# Patient Record
Sex: FEMALE | URBAN - METROPOLITAN AREA
[De-identification: names, ages, dates, MRNs, and addresses within clinical notes are randomized per-mention and may not be internally consistent; named-entity substitution may affect disease eponyms.]

---

## 2021-10-14 ENCOUNTER — APPOINTMENT (RX ONLY)
Dept: URBAN - METROPOLITAN AREA CLINIC 134 | Facility: CLINIC | Age: 66
Setting detail: DERMATOLOGY
End: 2021-10-14

## 2021-10-14 VITALS — WEIGHT: 134 LBS | HEIGHT: 64 IN

## 2021-10-14 DIAGNOSIS — L82.1 OTHER SEBORRHEIC KERATOSIS: ICD-10-CM

## 2021-10-14 DIAGNOSIS — L64.8 OTHER ANDROGENIC ALOPECIA: ICD-10-CM

## 2021-10-14 DIAGNOSIS — D485 NEOPLASM OF UNCERTAIN BEHAVIOR OF SKIN: ICD-10-CM

## 2021-10-14 DIAGNOSIS — D18.0 HEMANGIOMA: ICD-10-CM

## 2021-10-14 DIAGNOSIS — D22 MELANOCYTIC NEVI: ICD-10-CM

## 2021-10-14 DIAGNOSIS — Z71.89 OTHER SPECIFIED COUNSELING: ICD-10-CM

## 2021-10-14 DIAGNOSIS — L82.0 INFLAMED SEBORRHEIC KERATOSIS: ICD-10-CM

## 2021-10-14 DIAGNOSIS — L81.4 OTHER MELANIN HYPERPIGMENTATION: ICD-10-CM

## 2021-10-14 PROBLEM — D22.5 MELANOCYTIC NEVI OF TRUNK: Status: ACTIVE | Noted: 2021-10-14

## 2021-10-14 PROBLEM — D18.01 HEMANGIOMA OF SKIN AND SUBCUTANEOUS TISSUE: Status: ACTIVE | Noted: 2021-10-14

## 2021-10-14 PROBLEM — D48.5 NEOPLASM OF UNCERTAIN BEHAVIOR OF SKIN: Status: ACTIVE | Noted: 2021-10-14

## 2021-10-14 PROCEDURE — 11102 TANGNTL BX SKIN SINGLE LES: CPT | Mod: 59

## 2021-10-14 PROCEDURE — ? BIOPSY BY SHAVE METHOD

## 2021-10-14 PROCEDURE — ? PHOTO-DOCUMENTATION

## 2021-10-14 PROCEDURE — ? ADDITIONAL NOTES

## 2021-10-14 PROCEDURE — ? LIQUID NITROGEN

## 2021-10-14 PROCEDURE — ? TREATMENT REGIMEN

## 2021-10-14 PROCEDURE — 17110 DESTRUCTION B9 LES UP TO 14: CPT

## 2021-10-14 PROCEDURE — 99204 OFFICE O/P NEW MOD 45 MIN: CPT | Mod: 25

## 2021-10-14 PROCEDURE — ? COUNSELING

## 2021-10-14 ASSESSMENT — LOCATION DETAILED DESCRIPTION DERM
LOCATION DETAILED: INFERIOR THORACIC SPINE
LOCATION DETAILED: LEFT INFERIOR MEDIAL UPPER BACK
LOCATION DETAILED: RIGHT DISTAL PRETIBIAL REGION
LOCATION DETAILED: RIGHT PROXIMAL PRETIBIAL REGION
LOCATION DETAILED: RIGHT ANTERIOR PROXIMAL THIGH
LOCATION DETAILED: RIGHT VENTRAL PROXIMAL FOREARM
LOCATION DETAILED: RIGHT POSTERIOR SHOULDER
LOCATION DETAILED: LEFT DISTAL PRETIBIAL REGION

## 2021-10-14 ASSESSMENT — LOCATION SIMPLE DESCRIPTION DERM
LOCATION SIMPLE: RIGHT SHOULDER
LOCATION SIMPLE: LEFT PRETIBIAL REGION
LOCATION SIMPLE: RIGHT PRETIBIAL REGION
LOCATION SIMPLE: RIGHT FOREARM
LOCATION SIMPLE: RIGHT THIGH
LOCATION SIMPLE: LEFT UPPER BACK
LOCATION SIMPLE: UPPER BACK

## 2021-10-14 ASSESSMENT — LOCATION ZONE DERM
LOCATION ZONE: TRUNK
LOCATION ZONE: ARM
LOCATION ZONE: LEG

## 2021-11-23 ENCOUNTER — APPOINTMENT (RX ONLY)
Dept: URBAN - METROPOLITAN AREA CLINIC 134 | Facility: CLINIC | Age: 66
Setting detail: DERMATOLOGY
End: 2021-11-23

## 2021-11-23 VITALS — WEIGHT: 134 LBS | HEIGHT: 64 IN

## 2021-11-23 DIAGNOSIS — L98.8 OTHER SPECIFIED DISORDERS OF THE SKIN AND SUBCUTANEOUS TISSUE: ICD-10-CM

## 2021-11-23 PROBLEM — C44.91 BASAL CELL CARCINOMA OF SKIN, UNSPECIFIED: Status: ACTIVE | Noted: 2021-11-23

## 2021-11-23 PROCEDURE — ? TREATMENT REGIMEN

## 2021-11-23 PROCEDURE — ? PRESCRIPTION

## 2021-11-23 PROCEDURE — 99213 OFFICE O/P EST LOW 20 MIN: CPT

## 2021-11-23 PROCEDURE — ? ADDITIONAL NOTES

## 2021-11-23 PROCEDURE — ? MEDICAL CONSULTATION: FILLERS

## 2021-11-23 PROCEDURE — ? COUNSELING

## 2021-11-23 RX ORDER — IMIQUIMOD 50 MG/G
CREAM TOPICAL
Qty: 1 | Refills: 0 | Status: ERX | COMMUNITY
Start: 2021-11-23

## 2021-11-23 RX ADMIN — IMIQUIMOD: 50 CREAM TOPICAL at 00:00

## 2022-02-03 ENCOUNTER — APPOINTMENT (RX ONLY)
Dept: URBAN - METROPOLITAN AREA CLINIC 133 | Facility: CLINIC | Age: 67
Setting detail: DERMATOLOGY
End: 2022-02-03

## 2022-02-03 VITALS — HEIGHT: 64 IN | WEIGHT: 134 LBS

## 2022-02-03 DIAGNOSIS — Z41.9 ENCOUNTER FOR PROCEDURE FOR PURPOSES OTHER THAN REMEDYING HEALTH STATE, UNSPECIFIED: ICD-10-CM

## 2022-02-03 PROBLEM — C44.612 BASAL CELL CARCINOMA OF SKIN OF RIGHT UPPER LIMB, INCLUDING SHOULDER: Status: ACTIVE | Noted: 2022-02-03

## 2022-02-03 PROCEDURE — ? ADDITIONAL NOTES

## 2022-02-03 PROCEDURE — 17260 DSTRJ MAL LES T/A/L 0.5 CM/<: CPT

## 2022-02-03 PROCEDURE — ? CRYOSURGICAL DESTRUCTION

## 2022-02-03 PROCEDURE — ? COUNSELING

## 2022-02-03 PROCEDURE — ? PATIENT SPECIFIC COUNSELING

## 2023-06-20 ENCOUNTER — APPOINTMENT (RX ONLY)
Dept: URBAN - METROPOLITAN AREA CLINIC 133 | Facility: CLINIC | Age: 68
Setting detail: DERMATOLOGY
End: 2023-06-20

## 2023-06-20 VITALS — HEIGHT: 64 IN | WEIGHT: 130 LBS

## 2023-06-20 DIAGNOSIS — L82.1 OTHER SEBORRHEIC KERATOSIS: ICD-10-CM

## 2023-06-20 DIAGNOSIS — L72.8 OTHER FOLLICULAR CYSTS OF THE SKIN AND SUBCUTANEOUS TISSUE: ICD-10-CM

## 2023-06-20 DIAGNOSIS — Z71.89 OTHER SPECIFIED COUNSELING: ICD-10-CM

## 2023-06-20 DIAGNOSIS — L57.0 ACTINIC KERATOSIS: ICD-10-CM

## 2023-06-20 PROCEDURE — ? LIQUID NITROGEN

## 2023-06-20 PROCEDURE — 17000 DESTRUCT PREMALG LESION: CPT

## 2023-06-20 PROCEDURE — ? COUNSELING

## 2023-06-20 PROCEDURE — 99213 OFFICE O/P EST LOW 20 MIN: CPT | Mod: 25

## 2023-06-20 ASSESSMENT — LOCATION ZONE DERM
LOCATION ZONE: NOSE
LOCATION ZONE: LEG
LOCATION ZONE: HAND

## 2023-06-20 ASSESSMENT — LOCATION SIMPLE DESCRIPTION DERM
LOCATION SIMPLE: RIGHT HAND
LOCATION SIMPLE: LEFT POSTERIOR THIGH
LOCATION SIMPLE: LEFT NOSE

## 2023-06-20 ASSESSMENT — LOCATION DETAILED DESCRIPTION DERM
LOCATION DETAILED: LEFT DISTAL LATERAL POSTERIOR THIGH
LOCATION DETAILED: LEFT NASAL SIDEWALL
LOCATION DETAILED: RIGHT RADIAL DORSAL HAND

## 2023-08-17 ENCOUNTER — APPOINTMENT (RX ONLY)
Dept: URBAN - METROPOLITAN AREA CLINIC 133 | Facility: CLINIC | Age: 68
Setting detail: DERMATOLOGY
End: 2023-08-17

## 2023-08-17 VITALS — HEIGHT: 64 IN | WEIGHT: 127 LBS

## 2023-08-17 DIAGNOSIS — L81.4 OTHER MELANIN HYPERPIGMENTATION: ICD-10-CM

## 2023-08-17 DIAGNOSIS — D18.0 HEMANGIOMA: ICD-10-CM

## 2023-08-17 DIAGNOSIS — L82.1 OTHER SEBORRHEIC KERATOSIS: ICD-10-CM

## 2023-08-17 DIAGNOSIS — Z71.89 OTHER SPECIFIED COUNSELING: ICD-10-CM

## 2023-08-17 DIAGNOSIS — L57.0 ACTINIC KERATOSIS: ICD-10-CM

## 2023-08-17 DIAGNOSIS — D22 MELANOCYTIC NEVI: ICD-10-CM

## 2023-08-17 DIAGNOSIS — Z85.828 PERSONAL HISTORY OF OTHER MALIGNANT NEOPLASM OF SKIN: ICD-10-CM

## 2023-08-17 PROBLEM — D18.01 HEMANGIOMA OF SKIN AND SUBCUTANEOUS TISSUE: Status: ACTIVE | Noted: 2023-08-17

## 2023-08-17 PROBLEM — D22.5 MELANOCYTIC NEVI OF TRUNK: Status: ACTIVE | Noted: 2023-08-17

## 2023-08-17 PROCEDURE — ? LIQUID NITROGEN

## 2023-08-17 PROCEDURE — 17000 DESTRUCT PREMALG LESION: CPT

## 2023-08-17 PROCEDURE — 17003 DESTRUCT PREMALG LES 2-14: CPT

## 2023-08-17 PROCEDURE — ? COUNSELING

## 2023-08-17 PROCEDURE — 99213 OFFICE O/P EST LOW 20 MIN: CPT | Mod: 25

## 2023-08-17 ASSESSMENT — LOCATION DETAILED DESCRIPTION DERM
LOCATION DETAILED: LEFT DISTAL LATERAL POSTERIOR THIGH
LOCATION DETAILED: LEFT DISTAL DORSAL FOREARM
LOCATION DETAILED: RIGHT INFERIOR LATERAL MIDBACK
LOCATION DETAILED: LEFT RADIAL DORSAL HAND
LOCATION DETAILED: RIGHT POSTERIOR SHOULDER
LOCATION DETAILED: LEFT CENTRAL FRONTAL SCALP
LOCATION DETAILED: INFERIOR THORACIC SPINE
LOCATION DETAILED: RIGHT PROXIMAL DORSAL FOREARM
LOCATION DETAILED: EPIGASTRIC SKIN

## 2023-08-17 ASSESSMENT — LOCATION SIMPLE DESCRIPTION DERM
LOCATION SIMPLE: LEFT POSTERIOR THIGH
LOCATION SIMPLE: RIGHT SHOULDER
LOCATION SIMPLE: RIGHT FOREARM
LOCATION SIMPLE: LEFT FOREARM
LOCATION SIMPLE: UPPER BACK
LOCATION SIMPLE: RIGHT LOWER BACK
LOCATION SIMPLE: LEFT HAND
LOCATION SIMPLE: LEFT SCALP
LOCATION SIMPLE: ABDOMEN

## 2023-08-17 ASSESSMENT — LOCATION ZONE DERM
LOCATION ZONE: ARM
LOCATION ZONE: HAND
LOCATION ZONE: LEG
LOCATION ZONE: TRUNK
LOCATION ZONE: SCALP

## 2024-07-18 ENCOUNTER — APPOINTMENT (RX ONLY)
Dept: URBAN - METROPOLITAN AREA CLINIC 133 | Facility: CLINIC | Age: 69
Setting detail: DERMATOLOGY
End: 2024-07-18

## 2024-07-18 DIAGNOSIS — D22 MELANOCYTIC NEVI: ICD-10-CM

## 2024-07-18 DIAGNOSIS — Z71.89 OTHER SPECIFIED COUNSELING: ICD-10-CM

## 2024-07-18 DIAGNOSIS — L72.0 EPIDERMAL CYST: ICD-10-CM

## 2024-07-18 DIAGNOSIS — L82.1 OTHER SEBORRHEIC KERATOSIS: ICD-10-CM

## 2024-07-18 DIAGNOSIS — D69.2 OTHER NONTHROMBOCYTOPENIC PURPURA: ICD-10-CM

## 2024-07-18 DIAGNOSIS — L81.4 OTHER MELANIN HYPERPIGMENTATION: ICD-10-CM

## 2024-07-18 PROBLEM — D22.5 MELANOCYTIC NEVI OF TRUNK: Status: ACTIVE | Noted: 2024-07-18

## 2024-07-18 PROCEDURE — 10040 EXTRACTION: CPT

## 2024-07-18 PROCEDURE — ? ACNE SURGERY (MULTIPLE LESIONS)

## 2024-07-18 PROCEDURE — ? COUNSELING

## 2024-07-18 PROCEDURE — 99213 OFFICE O/P EST LOW 20 MIN: CPT | Mod: 25

## 2024-07-18 PROCEDURE — ? TREATMENT REGIMEN

## 2024-07-18 ASSESSMENT — LOCATION ZONE DERM
LOCATION ZONE: NOSE
LOCATION ZONE: ARM
LOCATION ZONE: FACE
LOCATION ZONE: TRUNK

## 2024-07-18 ASSESSMENT — LOCATION DETAILED DESCRIPTION DERM
LOCATION DETAILED: LEFT MEDIAL UPPER BACK
LOCATION DETAILED: LEFT INFERIOR CENTRAL MALAR CHEEK
LOCATION DETAILED: LEFT INFERIOR LATERAL MIDBACK
LOCATION DETAILED: LEFT NASAL SIDEWALL
LOCATION DETAILED: RIGHT PROXIMAL DORSAL FOREARM
LOCATION DETAILED: RIGHT DISTAL DORSAL FOREARM
LOCATION DETAILED: RIGHT SUPERIOR NASAL CHEEK
LOCATION DETAILED: NASAL DORSUM
LOCATION DETAILED: LEFT PROXIMAL DORSAL FOREARM

## 2024-07-18 ASSESSMENT — LOCATION SIMPLE DESCRIPTION DERM
LOCATION SIMPLE: LEFT UPPER BACK
LOCATION SIMPLE: RIGHT CHEEK
LOCATION SIMPLE: NOSE
LOCATION SIMPLE: LEFT FOREARM
LOCATION SIMPLE: LEFT CHEEK
LOCATION SIMPLE: RIGHT FOREARM
LOCATION SIMPLE: LEFT NOSE
LOCATION SIMPLE: LEFT LOWER BACK

## 2024-09-23 ENCOUNTER — OFFICE VISIT (OUTPATIENT)
Dept: FAMILY MEDICINE CLINIC | Facility: CLINIC | Age: 69
End: 2024-09-23
Payer: MEDICARE

## 2024-09-23 VITALS
TEMPERATURE: 97.2 F | SYSTOLIC BLOOD PRESSURE: 128 MMHG | BODY MASS INDEX: 24.11 KG/M2 | RESPIRATION RATE: 16 BRPM | OXYGEN SATURATION: 99 % | DIASTOLIC BLOOD PRESSURE: 78 MMHG | WEIGHT: 141.2 LBS | HEART RATE: 58 BPM | HEIGHT: 64 IN

## 2024-09-23 DIAGNOSIS — E78.5 DYSLIPIDEMIA: ICD-10-CM

## 2024-09-23 DIAGNOSIS — Z78.0 POST-MENOPAUSAL: ICD-10-CM

## 2024-09-23 DIAGNOSIS — Z90.722 HISTORY OF HYSTERECTOMY WITH BILATERAL OOPHORECTOMY: ICD-10-CM

## 2024-09-23 DIAGNOSIS — Z13.0 SCREENING, DEFICIENCY ANEMIA, IRON: ICD-10-CM

## 2024-09-23 DIAGNOSIS — Z76.89 ENCOUNTER TO ESTABLISH CARE: Primary | ICD-10-CM

## 2024-09-23 DIAGNOSIS — G43.009 MIGRAINE WITHOUT AURA AND WITHOUT STATUS MIGRAINOSUS, NOT INTRACTABLE: ICD-10-CM

## 2024-09-23 DIAGNOSIS — E03.9 ACQUIRED HYPOTHYROIDISM: ICD-10-CM

## 2024-09-23 DIAGNOSIS — Z13.820 OSTEOPOROSIS SCREENING: ICD-10-CM

## 2024-09-23 DIAGNOSIS — Z90.710 HISTORY OF HYSTERECTOMY WITH BILATERAL OOPHORECTOMY: ICD-10-CM

## 2024-09-23 PROCEDURE — 99203 OFFICE O/P NEW LOW 30 MIN: CPT | Performed by: NURSE PRACTITIONER

## 2024-09-23 RX ORDER — BUPROPION HYDROCHLORIDE 150 MG/1
150 TABLET ORAL DAILY
COMMUNITY

## 2024-09-23 RX ORDER — THYROID 60 MG/1
TABLET ORAL
COMMUNITY

## 2024-09-23 RX ORDER — AMLODIPINE BESYLATE 2.5 MG/1
2.5 TABLET ORAL DAILY
COMMUNITY
End: 2024-09-23

## 2024-09-23 RX ORDER — DICLOFENAC POTASSIUM 50 MG/1
50 TABLET, FILM COATED ORAL
COMMUNITY
End: 2024-09-23

## 2024-09-23 RX ORDER — VERAPAMIL HYDROCHLORIDE 180 MG/1
180 TABLET, EXTENDED RELEASE ORAL
COMMUNITY

## 2024-09-23 RX ORDER — DULOXETIN HYDROCHLORIDE 30 MG/1
30 CAPSULE, DELAYED RELEASE ORAL DAILY
COMMUNITY

## 2024-09-23 RX ORDER — LOSARTAN POTASSIUM 25 MG/1
25 TABLET ORAL DAILY
COMMUNITY

## 2024-09-23 RX ORDER — RIZATRIPTAN BENZOATE 10 MG/1
10 TABLET ORAL AS NEEDED
COMMUNITY

## 2024-09-23 NOTE — PROGRESS NOTES
Assessment/Plan:    1. Encounter to establish care  2. History of hysterectomy with bilateral oophorectomy  3. Post-menopausal  -     DXA bone density spine hip and pelvis; Future; Expected date: 09/23/2024  4. Osteoporosis screening  -     DXA bone density spine hip and pelvis; Future; Expected date: 09/23/2024  5. Dyslipidemia  -     Comprehensive metabolic panel  -     Lipid panel; Future  6. Screening, deficiency anemia, iron  -     CBC and differential; Future  7. Acquired hypothyroidism  -     TSH, 3rd generation with Free T4 reflex  8. Migraine without aura and without status migrainosus, not intractable      The case discussed with patient using patient centered shared decision making.The patient was counseled regarding instructions for management,-- risk factor reductions,-- prognosis,-- impressions,-- risks and benefits of treatment options,-- importance of compliance with treatment. I have reviewed the instructions with the patient, answering all questions to her satisfaction.    Exam is acceptable  Patient will have her previous PCP in [place] send me records including her labs, stress echo, colonoscopy report, mammogram  Will continue current treatment plan as she is satisfied with the same  She will call me if she needs preop clearance for her abdominoplasty  Encouraged her to return to office for her annual wellness    Depression Screening and Follow-up Plan: Patient was screened for depression during today's encounter. They screened negative with a PHQ-2 score of 0.             Return aw, for Annual physical.    I have spent a total time of 35 minutes on 09/23/24 in caring for this patient including Diagnostic results, Prognosis, Risks and benefits of tx options, Instructions for management, Patient and family education, Importance of tx compliance, Risk factor reductions, Impressions, Counseling / Coordination of care, Documenting in the medical record, Reviewing / ordering tests, medicine,  procedures  , and Obtaining or reviewing history  .    Subjective:      Patient ID: Graciela Lazcano is a 69 y.o. female.    Chief Complaint   Patient presents with    New Patient Visit       69-year-old pleasant female presents to office to establish care  She moved to this area from [place]  We had lengthy discussion related to her past medical history-she is healthy, active, works as a   She is up-to-date with her screenings mammogram, colonoscopy  She is due for her DEXA scan    Reports not available in chart but she shared on her phone stress echo that was performed-it was normal  I reviewed blood work from 2024-CBC normal, lipids , triglycerides 120s, CMP normal glucose normal kidney and liver function, TSH approximate 0.4    She is status post total hysterectomy oophorectomy    She has history of depression which is stabilized on bupropion and duloxetine  She is age-related arthritis for which she takes occasional meloxicam    She gets approximately 2 migraines per month    She is planning on having abdominoplasty next month    Father had coronary artery bypass surgery in his 60s,  from lung cancer  Mother age related congestive heart failure      Reviewed medical history in detail. Changes to medical record changed where appropriate. Reviewed medications. Patient taking as prescribed. Tolerating well. Denies Side effects. Reviewed recent visits with specialists co-managing chronic conditions.     The following portions of the patient's history were reviewed and updated as appropriate: allergies, current medications, past family history, past medical history, past social history, past surgical history and problem list.    Review of Systems   Constitutional:  Negative for fatigue, fever and unexpected weight change.   HENT:  Negative for trouble swallowing.    Respiratory:  Negative for cough and shortness of breath.    Cardiovascular:  Negative for chest pain, palpitations and leg  "swelling.   Gastrointestinal:  Negative for abdominal pain and blood in stool.   Endocrine: Negative.    Genitourinary:  Negative for difficulty urinating and hematuria.   Musculoskeletal:  Positive for arthralgias.   Skin:  Negative for pallor.   Neurological:  Positive for headaches. Negative for dizziness, tremors, seizures, syncope and weakness.   Psychiatric/Behavioral:  Negative for confusion and dysphoric mood. The patient is not nervous/anxious.          Current Outpatient Medications   Medication Sig Dispense Refill    buPROPion (WELLBUTRIN XL) 150 mg 24 hr tablet Take 150 mg by mouth daily      DULoxetine (Cymbalta) 30 mg delayed release capsule Take 30 mg by mouth daily      losartan (COZAAR) 25 mg tablet Take 25 mg by mouth daily      rizatriptan (MAXALT) 10 mg tablet Take 10 mg by mouth as needed for migraine Take at the onset of migraine; if symptoms continue or return, may take another dose at least 2 hours after first dose. Take no more than 2 doses in a day.      thyroid (ARMOUR) 60 MG tablet NP Thyroid 60 mg tablet   TAKE ONE TABLET BY MOUTH ONE TIME DAILY      verapamil (CALAN-SR) 180 mg CR tablet Take 180 mg by mouth daily at bedtime       No current facility-administered medications for this visit.       Patient Active Problem List   Diagnosis    History of hysterectomy with bilateral oophorectomy    Dyslipidemia    Acquired hypothyroidism    Migraine without aura and without status migrainosus, not intractable       Objective:    /78 (BP Location: Left arm, Patient Position: Sitting, Cuff Size: Large)   Pulse 58   Temp (!) 97.2 °F (36.2 °C) (Temporal)   Resp 16   Ht 5' 4\" (1.626 m)   Wt 64 kg (141 lb 3.2 oz)   SpO2 99%   BMI 24.24 kg/m²        Physical Exam  Vitals and nursing note reviewed.   Constitutional:       General: She is not in acute distress.     Appearance: Normal appearance.   HENT:      Head: Normocephalic and atraumatic.      Mouth/Throat:      Mouth: Mucous " membranes are moist.   Eyes:      Pupils: Pupils are equal, round, and reactive to light.   Neck:      Vascular: No carotid bruit.   Cardiovascular:      Rate and Rhythm: Normal rate and regular rhythm. No extrasystoles are present.     Pulses: Normal pulses.      Heart sounds: Normal heart sounds. No murmur heard.     No S3 or S4 sounds.   Pulmonary:      Effort: Pulmonary effort is normal.      Breath sounds: Normal breath sounds.   Abdominal:      General: Bowel sounds are normal.      Palpations: Abdomen is soft.      Tenderness: There is no abdominal tenderness.   Musculoskeletal:      Right lower leg: No edema.      Left lower leg: No edema.   Lymphadenopathy:      Cervical: No cervical adenopathy.   Skin:     General: Skin is warm and dry.      Coloration: Skin is not pale.   Neurological:      General: No focal deficit present.      Mental Status: She is alert.      Motor: No weakness.      Gait: Gait normal.   Psychiatric:         Mood and Affect: Mood normal.         Behavior: Behavior normal.                ERVIN Murillo

## 2024-10-07 LAB
ERYTHROCYTE [DISTWIDTH] IN BLOOD BY AUTOMATED COUNT: 12.9 % (ref 12–16)
HCT VFR BLD AUTO: 44.4 % (ref 35–43)
HGB BLD-MCNC: 15.7 G/DL (ref 11.5–14.5)
MCH RBC QN AUTO: 31.4 PG (ref 26–34)
MCHC RBC AUTO-ENTMCNC: 35.4 G/DL (ref 32–37)
MCV RBC AUTO: 89 FL (ref 80–100)
PLATELET # BLD AUTO: 329 THOU/CMM (ref 140–350)
PMV BLD REES-ECKER: 7.8 FL (ref 7.5–11.3)
RBC # BLD AUTO: 5.01 MILL/CMM (ref 3.7–4.7)
WBC # BLD AUTO: 5.1 THOU/CMM (ref 4–10)

## 2024-10-15 ENCOUNTER — TELEPHONE (OUTPATIENT)
Age: 69
End: 2024-10-15

## 2024-10-15 NOTE — TELEPHONE ENCOUNTER
[facility] called they do not have surgery clearance forms to fill out   all they need is a statement from the doctor that the patient is cleared for surgery   the patients surgery is scheduled for this Friday   They need the statement faxed to [phone] if you have any questions or if the statement cannot be done before Friday please call lyle at [phone]  thank you         Also they have her listed under Graciela Otero

## 2024-10-15 NOTE — TELEPHONE ENCOUNTER
Please call office, I need to know exactly what procedure and what type of anesthesia will be used

## 2024-10-15 NOTE — TELEPHONE ENCOUNTER
Pt had PreOp clearance appt with you the same day she was a new patient on 9/23/24 and has sent you the EKG and lab work and needs you to fill out the hernia tummy tuck clearance form and send it to [facility], [address], [place] [address] and phone number [phone]. She will call them in ref to a clearance form to fill out and return our call.

## 2024-10-16 ENCOUNTER — TELEPHONE (OUTPATIENT)
Dept: FAMILY MEDICINE CLINIC | Facility: CLINIC | Age: 69
End: 2024-10-16

## 2024-10-16 NOTE — TELEPHONE ENCOUNTER
Patient called in post receipt of call from surgeon's office is requesting a note/letter to inform surgeon that she is cleared for surgery which is scheduled for Friday 10/18. Labs completed sent to PCP.    Having Hernia repair, tummy tuck, and liposuction done by:  [facility], [address] [place] [address]    [phone].   Fax # [phone] to Attn: Emily    Please follow up with patient for provider response.

## 2024-10-17 NOTE — TELEPHONE ENCOUNTER
Patient called to check fax status of pre op clearance note. Made the patient aware fax was completed.

## 2024-11-21 ENCOUNTER — HOSPITAL ENCOUNTER (OUTPATIENT)
Facility: HOSPITAL | Age: 69
Discharge: HOME | End: 2024-11-21
Payer: MEDICARE

## 2024-11-21 VITALS — BODY MASS INDEX: 24.1 KG/M2 | HEIGHT: 63 IN | WEIGHT: 136 LBS

## 2024-11-21 DIAGNOSIS — Z13.820 OSTEOPOROSIS SCREENING: ICD-10-CM

## 2024-11-21 DIAGNOSIS — Z78.0 POST-MENOPAUSAL: ICD-10-CM

## 2024-11-21 PROCEDURE — 77080 DXA BONE DENSITY AXIAL: CPT

## 2024-12-18 ENCOUNTER — RESULTS FOLLOW-UP (OUTPATIENT)
Dept: FAMILY MEDICINE CLINIC | Facility: CLINIC | Age: 69
End: 2024-12-18

## 2025-03-27 ENCOUNTER — TELEPHONE (OUTPATIENT)
Dept: FAMILY MEDICINE CLINIC | Facility: CLINIC | Age: 70
End: 2025-03-27

## 2025-07-08 ENCOUNTER — TELEPHONE (OUTPATIENT)
Dept: FAMILY MEDICINE CLINIC | Facility: CLINIC | Age: 70
End: 2025-07-08

## 2025-07-23 PROBLEM — E55.9 VITAMIN D DEFICIENCY: Chronic | Status: ACTIVE | Noted: 2022-05-10

## 2025-07-23 PROBLEM — E78.5 HYPERLIPIDEMIA, ACQUIRED: Chronic | Status: ACTIVE | Noted: 2022-05-10

## 2025-07-23 PROBLEM — E03.9 HYPOTHYROID: Status: ACTIVE | Noted: 2022-05-10

## 2025-07-23 PROBLEM — M54.2 NECK PAIN OF OVER 3 MONTHS DURATION: Status: ACTIVE | Noted: 2018-06-06

## 2025-07-23 PROBLEM — M60.9 MYOSITIS, UNSPECIFIED: Chronic | Status: ACTIVE | Noted: 2022-05-10

## 2025-07-23 PROBLEM — D64.9 ANEMIA: Status: ACTIVE | Noted: 2022-05-10

## 2025-07-23 PROBLEM — M25.562 ACUTE PAIN OF LEFT KNEE: Status: RESOLVED | Noted: 2023-02-07 | Resolved: 2025-07-23

## 2025-07-23 PROBLEM — M54.50 LOW BACK PAIN, UNSPECIFIED: Status: ACTIVE | Noted: 2018-08-08

## 2025-07-23 PROBLEM — M43.10 DEGENERATIVE SPONDYLOLISTHESIS: Status: ACTIVE | Noted: 2018-08-08

## 2025-07-23 PROBLEM — M50.30 DDD (DEGENERATIVE DISC DISEASE), CERVICAL: Status: ACTIVE | Noted: 2025-07-23

## 2025-07-23 PROBLEM — M48.061 LUMBAR FORAMINAL STENOSIS: Status: ACTIVE | Noted: 2025-07-23

## 2025-07-23 PROBLEM — S13.4XXA WHIPLASH: Status: RESOLVED | Noted: 2023-10-12 | Resolved: 2025-07-23

## 2025-07-23 PROBLEM — M10.9 GOUT: Chronic | Status: ACTIVE | Noted: 2022-05-10

## 2025-07-23 PROBLEM — E89.0 POST-OPERATIVE HYPOTHYROIDISM: Status: ACTIVE | Noted: 2022-05-10

## 2025-07-23 PROBLEM — R93.1 AGATSTON CORONARY ARTERY CALCIUM SCORE LESS THAN 100: Status: ACTIVE | Noted: 2025-07-23

## 2025-07-23 PROBLEM — K43.9 VENTRAL HERNIA: Chronic | Status: ACTIVE | Noted: 2023-10-05

## 2025-07-23 PROBLEM — D51.9 VITAMIN B12 DEFICIENCY ANEMIA, UNSPECIFIED: Chronic | Status: ACTIVE | Noted: 2022-05-25

## 2025-07-23 PROBLEM — D51.9 VITAMIN B12 DEFICIENCY ANEMIA, UNSPECIFIED: Chronic | Status: RESOLVED | Noted: 2022-05-25 | Resolved: 2025-07-23

## 2025-07-23 PROBLEM — I25.10 CORONARY ARTERY DISEASE: Chronic | Status: ACTIVE | Noted: 2023-06-11

## 2025-07-23 PROBLEM — E89.0 POST-OPERATIVE HYPOTHYROIDISM: Status: ACTIVE | Noted: 2025-07-23

## 2025-07-23 PROBLEM — M47.816 LUMBAR SPONDYLOSIS: Status: ACTIVE | Noted: 2018-08-08

## 2025-07-23 PROBLEM — I10 ESSENTIAL (PRIMARY) HYPERTENSION: Chronic | Status: ACTIVE | Noted: 2022-04-29

## 2025-07-23 PROBLEM — R13.10 DYSPHAGIA: Chronic | Status: ACTIVE | Noted: 2022-08-17

## 2025-07-23 PROBLEM — A04.8 HELICOBACTER PYLORI (H. PYLORI) INFECTION: Chronic | Status: ACTIVE | Noted: 2022-08-17

## 2025-07-23 PROBLEM — M25.562 ACUTE PAIN OF LEFT KNEE: Status: ACTIVE | Noted: 2023-02-07

## 2025-07-23 PROBLEM — S13.4XXA WHIPLASH: Status: ACTIVE | Noted: 2023-10-12

## 2025-07-24 ENCOUNTER — OFFICE VISIT (OUTPATIENT)
Dept: FAMILY MEDICINE CLINIC | Facility: CLINIC | Age: 70
End: 2025-07-24
Payer: MEDICARE

## 2025-07-24 VITALS
HEIGHT: 63 IN | SYSTOLIC BLOOD PRESSURE: 112 MMHG | BODY MASS INDEX: 24.73 KG/M2 | WEIGHT: 139.6 LBS | HEART RATE: 71 BPM | OXYGEN SATURATION: 97 % | TEMPERATURE: 97.4 F | DIASTOLIC BLOOD PRESSURE: 78 MMHG

## 2025-07-24 DIAGNOSIS — I10 ESSENTIAL (PRIMARY) HYPERTENSION: Chronic | ICD-10-CM

## 2025-07-24 DIAGNOSIS — E55.9 VITAMIN D DEFICIENCY: Chronic | ICD-10-CM

## 2025-07-24 DIAGNOSIS — E78.5 DYSLIPIDEMIA: ICD-10-CM

## 2025-07-24 DIAGNOSIS — M25.571 ACUTE RIGHT ANKLE PAIN: ICD-10-CM

## 2025-07-24 DIAGNOSIS — M50.30 DDD (DEGENERATIVE DISC DISEASE), CERVICAL: ICD-10-CM

## 2025-07-24 DIAGNOSIS — R05.3 CHRONIC COUGH: ICD-10-CM

## 2025-07-24 DIAGNOSIS — N39.46 MIXED INCONTINENCE: ICD-10-CM

## 2025-07-24 DIAGNOSIS — F32.0 CURRENT MILD EPISODE OF MAJOR DEPRESSIVE DISORDER WITHOUT PRIOR EPISODE (HCC): ICD-10-CM

## 2025-07-24 DIAGNOSIS — E89.0 POST-OPERATIVE HYPOTHYROIDISM: Primary | ICD-10-CM

## 2025-07-24 DIAGNOSIS — G43.009 MIGRAINE WITHOUT AURA AND WITHOUT STATUS MIGRAINOSUS, NOT INTRACTABLE: ICD-10-CM

## 2025-07-24 DIAGNOSIS — Z23 ENCOUNTER FOR IMMUNIZATION: ICD-10-CM

## 2025-07-24 DIAGNOSIS — R93.1 AGATSTON CORONARY ARTERY CALCIUM SCORE LESS THAN 100: ICD-10-CM

## 2025-07-24 DIAGNOSIS — M48.061 LUMBAR FORAMINAL STENOSIS: ICD-10-CM

## 2025-07-24 PROCEDURE — 99204 OFFICE O/P NEW MOD 45 MIN: CPT | Performed by: FAMILY MEDICINE

## 2025-07-24 PROCEDURE — G0009 ADMIN PNEUMOCOCCAL VACCINE: HCPCS

## 2025-07-24 PROCEDURE — 90677 PCV20 VACCINE IM: CPT

## 2025-07-24 NOTE — ASSESSMENT & PLAN NOTE
Pt has only ever been on NP thyroid. Currently on 60 mg.   Requested updated labs she had done recently at [facility]

## 2025-07-24 NOTE — ASSESSMENT & PLAN NOTE
Migraine Prior Authorization: [facility] for Acute Treatment of Migraines    1. Migraine, Acute Treatment ALL of the following (A and B):  At least 5 migraine attacks (episodic) fulfilling criteria below:  Headache attacks lasting 4-72 hr (untreated or unsuccessfully treated): Yes  Headache has at least two of the following four characteristics: Yes  - Unilateral location: Yes  - Pulsating quality: Yes  - Moderate or severe pain intensity: Yes  - Aggravation by or causing avoidance of routine physical activity (eg, walking or climbing stairs): Yes  During headache at least one of the following:  - Nausea and/or vomiting: Yes  - Photophobia and phonophobia: Yes  A) Patient is >= 18 years of age: Yes  B) Patient meets ONE of the following (i or ii):   OR  ii. Patient has a contraindication to triptan(s) according to the prescriber: Yes         Has tried and failed fiorinal, topamax, elavil, maxlat, relpax   Taken off triptan due to CAD and age > 60   Will instead start Nurtec as needed     Orders:    rimegepant sulfate (Nurtec) 75 mg TBDP; Take 1 tablet (75 mg total) by mouth once as needed (migraine. may repeat in one hour if needed. max 2 in 24 hours.) for up to 1 dose

## 2025-07-24 NOTE — PATIENT INSTRUCTIONS
Vaccines you are due for: Shingrix     As of January 2023 most vaccines are being covered by Medicare Part D.* This means you may now be able to get Shingrix or Tdap at no charge to you.  Please ask at the pharmacy to see if this is covered. If you are the pharmacist can administer these vaccines for you. Tdap is due every 10 years as a preventive vaccine. Shingrix is given as two parts. The second dose is given 2 - 6 months after the first dose.   *The only exceptions are flu, pneumonia, hepatitis B, and COVID-19 vaccinations, which are covered by Part B - this means they can be given at the pharmacy or the doctor's office.    Immunization History   Administered Date(s) Administered    COVID-19 MODERNA VACC 0.5 ML IM 05/31/2022    COVID-19 PFIZER VACCINE 0.3 ML IM 02/16/2021, 03/10/2021, 10/14/2021    Influenza, injectable, quadrivalent, preservative free 0.5 mL 10/26/2015    Influenza, seasonal, injectable, preservative free 10/31/2016    Tdap 10/04/2016    Zoster 01/27/2015        Graciela, let's talk about FIBER!! I know fiber can cause gas, bloating, cramping but fiber is such an excellent way to keep your intestines healthy, maintain regular bowel movements and reduce risks of colon diseases such as colon cancer. Many patients with obesity, hypertension, and diabetes use fiber as a way to feel full, lose weight, and keep bowel movements regular.      It is VERY important you learn 2 things form reading this: 1. What foods have fiber and 2. How much fiber is actually needed in a regular healthy lifestyle.      Now 25 grams or more fiber a day is the recommend amount for adults however, getting to 25gm fiber or higher must be done SLOWLY!!! Adding 5gm a fiber to your diet daily for 1-2 weeks then increase another 5gm is the BEST way to do this. If you go from 0 gms fiber to 25gm fiber in the same day, you will feel the gas, bloating, cramps and be uncomfortable. For kids they should have their age + 5. For example  a 5 year old needs about 10 grams/day, a 10 year old needs about 15 grams/day)     Examples foods with fiber:  Raspberries (8gm) Guava (9gm) Figs (9gm)   Barley (6gm) Kiwi (4gm) Refried beans (6gm)   Beets (6gm) Chickpeas (5gm) Grapefruit (4gm)   Blackberries (8gm) Pear (6gm) Avocado (8gm)   Edamame (8gm) Black Beans (15gm) Quinoa (5gm)   Almonds (4gm) Apples (4gm) Brussel Sprouts (4gm)  Spinach      Benefiber is a great powder that dissolves in warm water, coffee, hot tea which can be added teaspoon a ta time to help add fiber to your diet, especially if some of these foods don't agree with you.     You could also try Metamucil.      Miralax works as both a fiber supplement and a stool softener. This can safely be used every day to prevent and treat constipation.      Again, SLOWLY!!!! Add fiber slowly as you will feel side effecst of too much fiber too fast.      Other examples include:  BREADS: Made with 100% whole wheat flour; tegan, wheat or rye crackers; tortillas, bran muffins   CEREALS: Whole grain cereal with bran (Chex, Raisin Bran, Corn Bran), oatmeal, rolled oats, granola, wheat flakes, brown rice   NUTS: Any nuts   FRUITS: All fresh fruits along with edible skins, (bananas, citrus fruit, mangoes, pears, prunes, raisins, apples, pineapple, apricot, melon, jams and marmalades), fruit juices (especially prune juice)   VEGETABLES: All types, preferably raw or lightly cooked: especially, celery, eggplant, potatoes,spinach, broccoli, brussel sprouts, winter squash, carrots, cauliflower, soybeans, lentils, fresh and dried beans of all kinds

## 2025-07-24 NOTE — ASSESSMENT & PLAN NOTE
Records show she declines a statin, but eats a healthy diet   Requested labs.   Orders:    Lipid panel    Vitamin D 25 hydroxy

## 2025-07-24 NOTE — PROGRESS NOTES
Follow up visit   Name: Graciela Lazcano      : 1955      MRN: [id number]  Encounter Provider: Eufemia Rinaldi DO  Encounter Date: 2025   Encounter department: [facility]    :  Assessment & Plan  Post-operative hypothyroidism  Pt has only ever been on NP thyroid. Currently on 60 mg.   Requested updated labs she had done recently at [facility] coronary artery calcium score less than 100  Records show she declines a statin, but eats a healthy diet   Requested labs.   Orders:    Lipid panel    Vitamin D 25 hydroxy    DDD (degenerative disc disease), cervical  No changes        Lumbar foraminal stenosis  No changes        Dyslipidemia  Records show she declines a statin, but eats a healthy diet   Requested labs.   Orders:    Lipid panel    Vitamin D 25 hydroxy    Vitamin D deficiency  Update labs   Orders:    Lipid panel    Vitamin D 25 hydroxy    Essential (primary) hypertension  Well controlled on Losartan 25 mg daily and Verapamil 240 mg daily        Migraine without aura and without status migrainosus, not intractable  Migraine Prior Authorization: [facility] for Acute Treatment of Migraines    1. Migraine, Acute Treatment ALL of the following (A and B):  At least 5 migraine attacks (episodic) fulfilling criteria below:  Headache attacks lasting 4-72 hr (untreated or unsuccessfully treated): Yes  Headache has at least two of the following four characteristics: Yes  - Unilateral location: Yes  - Pulsating quality: Yes  - Moderate or severe pain intensity: Yes  - Aggravation by or causing avoidance of routine physical activity (eg, walking or climbing stairs): Yes  During headache at least one of the following:  - Nausea and/or vomiting: Yes  - Photophobia and phonophobia: Yes  A) Patient is >= 18 years of age: Yes  B) Patient meets ONE of the following (i or ii):   OR  ii. Patient has a contraindication to triptan(s) according to the prescriber: Yes         Has tried and  failed fiorinal, topamax, elavil, maxlat, relpax   Taken off triptan due to CAD and age > 60   Will instead start Nurtec as needed     Orders:    rimegepant sulfate (Nurtec) 75 mg TBDP; Take 1 tablet (75 mg total) by mouth once as needed (migraine. may repeat in one hour if needed. max 2 in 24 hours.) for up to 1 dose    Acute right ankle pain  Refer to podiatry     Orders:    Ambulatory referral to Podiatry; Future    Mixed incontinence  Refer to urogyn   Orders:    Ambulatory Referral to Urogynecology; Future    Chronic cough  Pt concerned with family history of lung cancer  Chest x-ray ordered   Orders:    XR chest pa and lateral; Future    Encounter for immunization  Update PCV20 today   Recommend RSV and Shingrix at the pharmacy   Orders:    Pneumococcal Conjugate Vaccine 20-valent (Pcv20)    Current mild episode of major depressive disorder without prior episode (HCC)  Has been treated with Cymbalta 30 mg and Wellbutrin 150 mg          10 minutes spent on chart prep, 45 minutes spent with patient counseling/educating on their diagnoses, tests completed and any new tests ordered, any referrals placed, treatment options, and documentation of above today.   In prescribing new medications, or changing doses, we reviewed the risks and benefits and side effects of these medications along with other treatment options if appropriate.     Assessment & Plan          Follow up:   No follow-ups on file.        Graciela is a 70 y.o. female who presents today for follow up on chronic conditions.     Chief Complaint   Patient presents with    New Patient Visit     Pt exp burning in foot from ankle down.   Fm hx of lung cancer - currently exp a dry cough and is nervous   Very sick and nauseated after eating, with abd pain.   Pt c/o of peeing after coughing sneezing or laughing     History of Present Illness     Concerns today:  New patient here with her    They moved from [place]  They were seeing a  provider  "there  Was on synthroid years ago   On NP thyroid   Dry hair and dry skin     Has an appointment with a hormone specialist Mandi Mcmullen    Testosterone 71   Hormone pellets [place]   c/o Heat intolerance     Leaks urine with sneezing     Dry cough 4 months  Cousin and dad lung cancer   Scratchy in throat   Does not wake her up at night   No blood   Started a Year after [facility], getting worse     After a 3 week trip and a lot of walking     Amkles were swollen for the first time  Feet were very hot, burning like it was on fire     Used to get migraines often. 2 years ago   Thought she was getting rebound headache   Started on verapamil and helped her migraines   Now once a month migraines. Triptan helps.     Labs requested from [facility] - scanned in   Testosterone high 71  Glu 74   Cr 0.76   TSH 0.77, free T4 1.1, free T3 4.5 (normal 2.3-4.2)  Hgb 15.0, Hct 45.4      Review of Systems  ROS:  all others negative - no chest pain, SOB, normal urine and bowels. no GERD. sleeping well. mood good. As above.     PHQ-2/9 Depression Screening    Little interest or pleasure in doing things: 0 - not at all  Feeling down, depressed, or hopeless: 1 - several days  PHQ-2 Score: 1  PHQ-2 Interpretation: Negative depression screen        .[email or web address]    Objective   /78 (Patient Position: Sitting, Cuff Size: Standard)   Pulse 71   Temp (!) 97.4 °F (36.3 °C) (Temporal)   Ht 5' 3\" (1.6 m)   Wt 63.3 kg (139 lb 9.6 oz)   SpO2 97%   BMI 24.73 kg/m²     /78 (Patient Position: Sitting, Cuff Size: Standard)   Pulse 71   Temp (!) 97.4 °F (36.3 °C) (Temporal)   Ht 5' 3\" (1.6 m)   Wt 63.3 kg (139 lb 9.6 oz)   SpO2 97%   BMI 24.73 kg/m²     BP Readings from Last 3 Encounters:   07/24/25 112/78   09/23/24 128/78     Wt Readings from Last 3 Encounters:   07/24/25 63.3 kg (139 lb 9.6 oz)   11/21/24 61.7 kg (136 lb)   09/23/24 64 kg (141 lb 3.2 oz)       Physical Exam  Constitutional: she appears well-developed and " well-nourished.   HENT: Head: Normocephalic.   Right Ear: External ear normal. Tympanic membrane normal.   Left Ear: External ear normal. Tympanic membrane normal.   Nose: Nose normal. No mucosal edema, No rhinorrhea.   Right sinus exhibits no maxillary sinus tenderness.   Left sinus exhibits no maxillary sinus tenderness.   Mouth/Throat: Oropharynx is clear and moist.   Eyes: Normal conjunctiva.  No erythema. No discharge.  Neck: No pain on exam. Neck supple.   Cardiovascular: Normal rate, regular rhythm and normal heart sounds.    Pulmonary/Chest: Effort normal and breath sounds normal. No wheezes. No rales. No rhonchi.   Abdominal: Soft. Bowel sounds are normal. There is no tenderness.   Musculoskeletal: she exhibits no edema.   Lymphadenopathy: she has no cervical adenopathy.   Neurological: she  is alert and oriented to person, place, and time.   Skin: Skin is warm and dry. No rashes.  Psychiatric: she  has a normal mood and affect. her behavior is normal. Thought content normal.   Vitals reviewed.      Current Medications:  Current Medications[1]         [1]   Current Outpatient Medications   Medication Sig Dispense Refill    buPROPion (WELLBUTRIN XL) 150 mg 24 hr tablet Take 150 mg by mouth in the morning.      DULoxetine (Cymbalta) 30 mg delayed release capsule Take 30 mg by mouth in the morning.      losartan (COZAAR) 25 mg tablet Take 25 mg by mouth in the morning.      rimegepant sulfate (Nurtec) 75 mg TBDP Take 1 tablet (75 mg total) by mouth once as needed (migraine. may repeat in one hour if needed. max 2 in 24 hours.) for up to 1 dose 10 tablet 2    thyroid (ARMOUR) 60 MG tablet       verapamil (CALAN-SR) 240 mg CR tablet Take 1 tablet (240 mg total) by mouth daily at bedtime       No current facility-administered medications for this visit.

## 2025-07-25 RX ORDER — RIMEGEPANT SULFATE 75 MG/75MG
75 TABLET, ORALLY DISINTEGRATING ORAL ONCE AS NEEDED
Qty: 10 TABLET | Refills: 2 | Status: SHIPPED | OUTPATIENT
Start: 2025-07-25

## 2025-07-26 RX ORDER — VERAPAMIL HYDROCHLORIDE 240 MG/1
240 TABLET, FILM COATED, EXTENDED RELEASE ORAL
COMMUNITY
Start: 2025-07-26

## 2025-07-28 ENCOUNTER — TELEPHONE (OUTPATIENT)
Dept: FAMILY MEDICINE CLINIC | Facility: CLINIC | Age: 70
End: 2025-07-28